# Patient Record
(demographics unavailable — no encounter records)

---

## 2024-10-28 NOTE — HISTORY OF PRESENT ILLNESS
[de-identified] : Patient is a 67 year old female who presents with low back pain. This has been occurring for a while. She endorses the pain radiates down the LT LE past the knee. She reports she frequently walks and does Pilates, however pain is exacerbated with side sleeping and crossing of her legs. Patient applies OTC Voltaren gel for this issue. Denies saddle anesthesia.

## 2024-10-28 NOTE — PHYSICAL EXAM
[de-identified] : Lumbar Physical Exam Gait - Normal Station - Normal Sagittal Balance - Normal Compensatory Mechanism? - None  Heel walk - Normal Toe walk - Normal  Reflexes  Patellar - Normal Gastroc - Normal Clonus - No  Hip Exam - Normal Straight leg raise - None  Pulses - 2+ DP/PT Range of motion - Normal   Sensation Sensation intact to light touch in L1, L2, L3, L4, L5, and S1 dermatomes bilaterally Motor IP Quad HS TA Gastroc EHL Right 3+/5 5/5 5/5 5/5 5/5 5/5 5/5 Left 3+/5 5/5 5/5 5/5 5/5 5/5 5/5 [de-identified] : Lumbar radiographs: facet arthropathy  L4-L5 slight spondylolisthesis with slight motion declines

## 2024-10-28 NOTE — ADDENDUM
[FreeTextEntry1] : I, Laura Mojica, acted solely as a scribe for Dr. George Thomas MD on this date 10/28/2024 .   All medical record entries made by the Scribe were at my, Dr. George Thomas MD., direction and personally dictated by me on 03/06/2024. I have reviewed the chart and agree that the record accurately reflects my personal performance of the history, physical exam, assessment and plan. I have also personally directed, reviewed, and agreed with the chart.

## 2024-10-28 NOTE — ASSESSMENT
[FreeTextEntry1] : I had a lengthy discussion with the patient in regards to the treatment plan and diagnosis. The patient does have objective weakness findings on my exam. Furthermore I am concerned in regards to compression along the patient's spinal cord and/or nerve roots. As a result I would like to proceed, I would like to proceed with an MRI of the patient's lumbar spine. In tandem with this the patient should begin physical therapy focused on their back. Diclofenac Gel was prescribed to the patient, which she will use as instructed. I will have the patient follow-up in 3-4 weeks for repeat clinical evaluation. I encouraged the patient to reach out to me directly at any point if their symptoms worsen or change in any way.